# Patient Record
Sex: FEMALE | Race: WHITE | NOT HISPANIC OR LATINO | URBAN - METROPOLITAN AREA
[De-identification: names, ages, dates, MRNs, and addresses within clinical notes are randomized per-mention and may not be internally consistent; named-entity substitution may affect disease eponyms.]

---

## 2022-12-22 ENCOUNTER — OFFICE VISIT (OUTPATIENT)
Dept: URBAN - METROPOLITAN AREA CLINIC 118 | Facility: CLINIC | Age: 72
End: 2022-12-22
Payer: MEDICARE

## 2022-12-22 VITALS
HEART RATE: 110 BPM | WEIGHT: 139 LBS | SYSTOLIC BLOOD PRESSURE: 150 MMHG | TEMPERATURE: 96.8 F | BODY MASS INDEX: 24.63 KG/M2 | HEIGHT: 63 IN | DIASTOLIC BLOOD PRESSURE: 88 MMHG

## 2022-12-22 DIAGNOSIS — R74.8 ELEVATED ALKALINE PHOSPHATASE LEVEL: ICD-10-CM

## 2022-12-22 DIAGNOSIS — Q21.12 PATENT FORAMEN OVALE: ICD-10-CM

## 2022-12-22 DIAGNOSIS — Q24.8: ICD-10-CM

## 2022-12-22 PROCEDURE — 99204 OFFICE O/P NEW MOD 45 MIN: CPT | Performed by: INTERNAL MEDICINE

## 2022-12-22 RX ORDER — DIGOXIN 125 UG/1
1 TABLET TABLET ORAL
Status: ACTIVE | COMMUNITY

## 2022-12-22 RX ORDER — ATORVASTATIN CALCIUM 10 MG/1
1 TABLET TABLET, FILM COATED ORAL ONCE A DAY
Status: ACTIVE | COMMUNITY

## 2022-12-22 RX ORDER — METOPROLOL SUCCINATE 50 MG/1
1 TABLET TABLET, FILM COATED, EXTENDED RELEASE ORAL ONCE A DAY
Status: ACTIVE | COMMUNITY

## 2022-12-22 RX ORDER — RIVAROXABAN 20 MG/1
1 TABLET WITH FOOD TABLET, FILM COATED ORAL ONCE A DAY
Status: ACTIVE | COMMUNITY

## 2022-12-22 NOTE — HPI-TODAY'S VISIT:
The patient is referred by Dr. Powers and for an elevated alkaline phosphatase level.  Note was sent.  She is a 80-year-old female who has had elevated alkaline phosphatase levels for at least the last 3 to 5 years by her report.  She noted this on blood work from her primary care and routine physicals.  Admit generally was only minimally elevated.  She has no history of jaundice, right upper quadrant pain, transaminitis, abnormal loss of weight, or intrinsic liver disease.  She has no family history of liver disease.  She has never been exposed to hepatitis.  Alkaline phosphatase fractionation showed no specific findings, but her GGT was elevated.  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  A recent CT scan showed significant right heart strain and a fatty liver.

## 2022-12-23 ENCOUNTER — DASHBOARD ENCOUNTERS (OUTPATIENT)
Age: 72
End: 2022-12-23

## 2022-12-26 LAB
ALBUMIN/GLOBULIN RATIO: 1.8
ALBUMIN: 4.7
ALKALINE PHOSPHATASE: 211
ALT (SGPT): 23
ANA SCREEN, IFA: POSITIVE
AST (SGOT): 26
BILIRUBIN, DIRECT: 0.5
BILIRUBIN, INDIRECT: 0.8
BILIRUBIN, TOTAL: 1.3
GLOBULIN: 2.6
MITOCHONDRIAL (M2) ANTIBODY: <=20
PARATHYROID HORMONE,: 86
PROTEIN, TOTAL: 7.3

## 2023-02-21 ENCOUNTER — OFFICE VISIT (OUTPATIENT)
Dept: URBAN - METROPOLITAN AREA CLINIC 118 | Facility: CLINIC | Age: 73
End: 2023-02-21